# Patient Record
Sex: MALE | Race: WHITE | HISPANIC OR LATINO
[De-identification: names, ages, dates, MRNs, and addresses within clinical notes are randomized per-mention and may not be internally consistent; named-entity substitution may affect disease eponyms.]

---

## 2021-05-11 ENCOUNTER — NON-APPOINTMENT (OUTPATIENT)
Age: 46
End: 2021-05-11

## 2021-05-11 ENCOUNTER — APPOINTMENT (OUTPATIENT)
Dept: CARDIOLOGY | Facility: CLINIC | Age: 46
End: 2021-05-11
Payer: COMMERCIAL

## 2021-05-11 VITALS
WEIGHT: 195 LBS | BODY MASS INDEX: 27.92 KG/M2 | HEART RATE: 117 BPM | DIASTOLIC BLOOD PRESSURE: 82 MMHG | HEIGHT: 70 IN | OXYGEN SATURATION: 97 % | SYSTOLIC BLOOD PRESSURE: 130 MMHG

## 2021-05-11 DIAGNOSIS — I48.0 PAROXYSMAL ATRIAL FIBRILLATION: ICD-10-CM

## 2021-05-11 DIAGNOSIS — Z00.00 ENCOUNTER FOR GENERAL ADULT MEDICAL EXAMINATION W/OUT ABNORMAL FINDINGS: ICD-10-CM

## 2021-05-11 DIAGNOSIS — Z87.891 PERSONAL HISTORY OF NICOTINE DEPENDENCE: ICD-10-CM

## 2021-05-11 DIAGNOSIS — Z83.438 FAMILY HISTORY OF OTHER DISORDER OF LIPOPROTEIN METABOLISM AND OTHER LIPIDEMIA: ICD-10-CM

## 2021-05-11 PROCEDURE — 93000 ELECTROCARDIOGRAM COMPLETE: CPT

## 2021-05-11 PROCEDURE — 99072 ADDL SUPL MATRL&STAF TM PHE: CPT

## 2021-05-11 PROCEDURE — 99245 OFF/OP CONSLTJ NEW/EST HI 55: CPT

## 2021-05-11 NOTE — DISCUSSION/SUMMARY
[ECG Normal Variant] : ECG normal variant [Non-specific ECG Changes] : abnormal ECG [Stress Test Treadmill] : an exercise treadmill test [Echocardiogram] : an echocardiogram [Hyperlipidemia] : hyperlipidemia [Stable] : stable [None] : There are no changes in medication management [Diet Modification] : diet modification [Exercise] : exercise [Weight Loss] : weight loss

## 2021-05-11 NOTE — HISTORY OF PRESENT ILLNESS
[FreeTextEntry1] : 45 year old male with prior hx of atrial fibrillation (2 episodes )after having alcohol , last  episode 2 years ago , hyperlipidemia , obesity , mild sleep apnea came for cardiac evaluation . Patient say he feels tingling sensation cold sensation when he runs in cold weather , noticed recently , patient denies any weakness or claudication , \par \par Patient did have hx of atrial fibrillation episode at age 35 when he had caffeine , alcohol,   again two years  after having alcohol 9 3 drinks ) converted to sinus rhythm \par \par patient does have elevated cholesterol , as well as HDL , patient does have hx mild sleep apnea , does not have day time fatigue , did gain 15 pounds , \par \par Patient exercise , does work out regularly , he has low heart rate  denies any dizziness or exertional fatigue

## 2021-05-11 NOTE — ASSESSMENT
[FreeTextEntry1] : Patient with above hx\par \par tingling sensation/ numbness extremities  while running in cold , possible cold induced vasospasm( Raynaud's mild )vs hyperventilation related ,  normal palpable arterial pulses  would obtain routine blood work \par \par hx of atrial fibrillation episodes last one 2 years , no recurrence on avoidance of alcohol intake , will obtain echo \par \par sinus bradycardia ICRBBB possible athlete heart  will obtain echo , exercise stress test to assess heart response to exercise .\par \par hyperlipidemia :  will obtain his blood work   encourage to loose weight with diet restriction \par \par Obesity : mild sleep apnea : encourage patient to follow life style modification ,weight loss , \par \par follow up after above ordered tests , and blood work \par

## 2021-05-11 NOTE — REASON FOR VISIT
[Symptom and Test Evaluation] : symptom and test evaluation [Arrhythmia/ECG Abnorrmalities] : arrhythmia/ECG abnormalities [Hyperlipidemia] : hyperlipidemia [Other: ____] : [unfilled]

## 2021-06-07 ENCOUNTER — APPOINTMENT (OUTPATIENT)
Dept: CARDIOLOGY | Facility: CLINIC | Age: 46
End: 2021-06-07
Payer: COMMERCIAL

## 2021-06-07 PROCEDURE — 93015 CV STRESS TEST SUPVJ I&R: CPT

## 2021-06-07 PROCEDURE — 99072 ADDL SUPL MATRL&STAF TM PHE: CPT

## 2021-06-07 PROCEDURE — 93306 TTE W/DOPPLER COMPLETE: CPT

## 2021-06-14 ENCOUNTER — APPOINTMENT (OUTPATIENT)
Dept: CARDIOLOGY | Facility: CLINIC | Age: 46
End: 2021-06-14
Payer: COMMERCIAL

## 2021-06-14 ENCOUNTER — TRANSCRIPTION ENCOUNTER (OUTPATIENT)
Age: 46
End: 2021-06-14

## 2021-06-14 VITALS
WEIGHT: 195 LBS | BODY MASS INDEX: 27.92 KG/M2 | DIASTOLIC BLOOD PRESSURE: 82 MMHG | TEMPERATURE: 98.6 F | HEIGHT: 70 IN | HEART RATE: 52 BPM | OXYGEN SATURATION: 97 % | SYSTOLIC BLOOD PRESSURE: 122 MMHG

## 2021-06-14 DIAGNOSIS — R20.2 PARESTHESIA OF SKIN: ICD-10-CM

## 2021-06-14 PROCEDURE — 99214 OFFICE O/P EST MOD 30 MIN: CPT

## 2021-06-14 PROCEDURE — 99072 ADDL SUPL MATRL&STAF TM PHE: CPT

## 2021-06-14 NOTE — HISTORY OF PRESENT ILLNESS
[FreeTextEntry1] : 45 year old male with prior hx of atrial fibrillation (2 episodes )after having alcohol , last  episode 2 years ago , hyperlipidemia , obesity , mild sleep apnea came for  follow up after having recommended test , patient had normal stress test without  stress induced arrhythmia ,  normal echo \par \par Patient did have hx of atrial fibrillation episode at age 35 when he had caffeine , alcohol,   again two years  after having alcohol 9 3 drinks ) converted to sinus rhythm \par \par patient does have elevated cholesterol , as well as HDL , patient does have hx mild sleep apnea , does not have day time fatigue , did gain 15 pounds ,   his blood work showed    \par \par Patient exercise , does work out regularly , he has low heart rate  denies any dizziness or exertional fatigue

## 2021-06-14 NOTE — CARDIOLOGY SUMMARY
[de-identified] : 5/11/21 marked sinus bradycardia  ICRBBB [de-identified] : 6/7/21 13 METS 87% MPHR  No ST changes [de-identified] : 6/7/21 normal study  EF 60%

## 2021-06-14 NOTE — ASSESSMENT
[FreeTextEntry1] : Patient with above hx\par \par tingling sensation/ numbness extremities  while running in cold , possible cold induced vasospasm( Raynaud's mild )vs hyperventilation related ,  normal palpable arterial pulses    \par \par hx of atrial fibrillation episodes last one 2 years , no recurrence on avoidance of alcohol intake , will obtain echo \par \par sinus bradycardia ICRBBB possible athlete heart   normal echo , normal exercise capacity with normal heart rate response \par \par hyperlipidemia :  will obtain his blood work   encourage to loose weight with diet restriction  continue red yeast \par \par Obesity : mild sleep apnea : encourage patient to follow life style modification ,weight loss , \par \par follow up after 6 months \par

## 2022-05-18 NOTE — DISCUSSION/SUMMARY
[ECG Normal Variant] : ECG normal variant [Non-specific ECG Changes] : abnormal ECG [Stress Test Treadmill] : an exercise treadmill test [Echocardiogram] : an echocardiogram [Hyperlipidemia] : hyperlipidemia [Stable] : stable [Diet Modification] : diet modification [Exercise] : exercise [Weight Loss] : weight loss

## 2022-05-23 ENCOUNTER — NON-APPOINTMENT (OUTPATIENT)
Age: 47
End: 2022-05-23

## 2022-05-23 ENCOUNTER — APPOINTMENT (OUTPATIENT)
Dept: CARDIOLOGY | Facility: CLINIC | Age: 47
End: 2022-05-23
Payer: COMMERCIAL

## 2022-05-23 VITALS
SYSTOLIC BLOOD PRESSURE: 124 MMHG | BODY MASS INDEX: 28.35 KG/M2 | HEIGHT: 70 IN | OXYGEN SATURATION: 99 % | HEART RATE: 47 BPM | DIASTOLIC BLOOD PRESSURE: 80 MMHG | WEIGHT: 198 LBS

## 2022-05-23 DIAGNOSIS — G47.30 SLEEP APNEA, UNSPECIFIED: ICD-10-CM

## 2022-05-23 PROCEDURE — 99214 OFFICE O/P EST MOD 30 MIN: CPT

## 2022-05-23 PROCEDURE — 93000 ELECTROCARDIOGRAM COMPLETE: CPT

## 2022-05-23 NOTE — CARDIOLOGY SUMMARY
[de-identified] : 5/23/22  marked sinus bradycardia  ICRBBB [de-identified] : 6/7/21 13 METS 87% MPHR  No ST changes [de-identified] : 6/7/21 normal study  EF 60%

## 2022-05-23 NOTE — ASSESSMENT
[FreeTextEntry1] : Patient with above hx\par \par tingling sensation/ numbness extremities  while running in cold , possible cold induced vasospasm( Raynaud's mild )vs hyperventilation related resolved without recurrence   normal palpable arterial pulses    \par \par hx of atrial fibrillation episodes last one 2 years , no recurrence on avoidance of alcohol intake , will obtain echo \par \par sinus bradycardia ICRBBB possible athlete heart   normal echo , normal exercise capacity with normal heart rate response \par \par hyperlipidemia :  will obtain his blood work   encourage to loose weight with diet restriction  continue red yeast \par \par Obesity : mild sleep apnea : encourage patient to follow life style modification ,weight loss , \par \par follow up after 6 months \par

## 2022-06-27 ENCOUNTER — APPOINTMENT (OUTPATIENT)
Dept: CARDIOLOGY | Facility: CLINIC | Age: 47
End: 2022-06-27

## 2022-06-28 ENCOUNTER — NON-APPOINTMENT (OUTPATIENT)
Age: 47
End: 2022-06-28

## 2022-11-21 ENCOUNTER — NON-APPOINTMENT (OUTPATIENT)
Age: 47
End: 2022-11-21

## 2022-11-21 ENCOUNTER — APPOINTMENT (OUTPATIENT)
Dept: CARDIOLOGY | Facility: CLINIC | Age: 47
End: 2022-11-21

## 2022-11-21 VITALS
SYSTOLIC BLOOD PRESSURE: 130 MMHG | WEIGHT: 195 LBS | HEIGHT: 70 IN | HEART RATE: 78 BPM | OXYGEN SATURATION: 98 % | BODY MASS INDEX: 27.92 KG/M2 | DIASTOLIC BLOOD PRESSURE: 82 MMHG

## 2022-11-21 VITALS — SYSTOLIC BLOOD PRESSURE: 130 MMHG | DIASTOLIC BLOOD PRESSURE: 80 MMHG

## 2022-11-21 DIAGNOSIS — E78.5 HYPERLIPIDEMIA, UNSPECIFIED: ICD-10-CM

## 2022-11-21 DIAGNOSIS — R94.31 ABNORMAL ELECTROCARDIOGRAM [ECG] [EKG]: ICD-10-CM

## 2022-11-21 DIAGNOSIS — E66.9 OBESITY, UNSPECIFIED: ICD-10-CM

## 2022-11-21 PROCEDURE — 93000 ELECTROCARDIOGRAM COMPLETE: CPT

## 2022-11-21 PROCEDURE — 99214 OFFICE O/P EST MOD 30 MIN: CPT | Mod: 25

## 2022-11-21 NOTE — ASSESSMENT
[FreeTextEntry1] : Patient with above hx\par \par tingling sensation/ numbness extremities  while running in cold , possible cold induced vasospasm( Raynaud's mild )vs hyperventilation related resolved without recurrence   normal palpable arterial pulses    no recurrence \par \par hx of atrial fibrillation episodes last one 2 years , no recurrence on avoidance of alcohol intake ,   no exercise induced arrhythmias \par \par sinus bradycardia ICRBBB possible athlete heart   normal echo , normal exercise capacity with normal heart rate response \par \par hyperlipidemia :  increasing LDL TC , had repeat blood work , will obtain his blood work report    encourage to loose weight with diet restriction  continue red yeast \par \par Obesity : mild sleep apnea : encourage patient to follow life style modification ,weight loss , \par \par follow up after 6 months \par

## 2022-11-21 NOTE — CARDIOLOGY SUMMARY
[de-identified] : 11/21/22   marked sinus bradycardia  ICRBBB [de-identified] : 6/7/21 13 METS 87% MPHR  No ST changes [de-identified] : 6/7/21 normal study  EF 60%

## 2022-11-21 NOTE — HISTORY OF PRESENT ILLNESS
[FreeTextEntry1] : 46 year old male with prior hx of atrial fibrillation (2 episodes )after having alcohol , last  episode 2 years ago , hyperlipidemia , obesity , mild sleep apnea came for  follow up says  he is doing well , \par \par \par patient was diagnosed to have mild sleep apnea , not happy with cpap , which is interfering  with his sleep pattern so he stopped using it , as he did not feel any difference \par \par Patient denies active complain \par \par patient had normal stress test without  stress induced arrhythmia ,  normal echo \par \par Patient did have hx of atrial fibrillation episode at age 35 when he had caffeine , alcohol,   again two years  after having alcohol  drinks ) converted to sinus rhythm \par \par patient does have elevated cholesterol , as well as HDL , patient does have hx mild sleep apnea , does not have day time fatigue, stable weight ,    his blood work showed       he is taking red yeast rice . his blood pressure is controlled \par \par Patient exercise , does work out regularly , he has low heart rate  denies any dizziness or exertional fatigue

## 2025-02-11 ENCOUNTER — NON-APPOINTMENT (OUTPATIENT)
Age: 50
End: 2025-02-11

## 2025-02-11 ENCOUNTER — APPOINTMENT (OUTPATIENT)
Dept: CARDIOLOGY | Facility: CLINIC | Age: 50
End: 2025-02-11
Payer: COMMERCIAL

## 2025-02-11 VITALS
OXYGEN SATURATION: 100 % | BODY MASS INDEX: 28.77 KG/M2 | SYSTOLIC BLOOD PRESSURE: 120 MMHG | WEIGHT: 201 LBS | HEIGHT: 70 IN | DIASTOLIC BLOOD PRESSURE: 80 MMHG | HEART RATE: 57 BPM

## 2025-02-11 DIAGNOSIS — G47.30 SLEEP APNEA, UNSPECIFIED: ICD-10-CM

## 2025-02-11 DIAGNOSIS — R94.31 ABNORMAL ELECTROCARDIOGRAM [ECG] [EKG]: ICD-10-CM

## 2025-02-11 DIAGNOSIS — E78.5 HYPERLIPIDEMIA, UNSPECIFIED: ICD-10-CM

## 2025-02-11 DIAGNOSIS — E66.3 OVERWEIGHT: ICD-10-CM

## 2025-02-11 PROCEDURE — G2211 COMPLEX E/M VISIT ADD ON: CPT | Mod: NC

## 2025-02-11 PROCEDURE — 93000 ELECTROCARDIOGRAM COMPLETE: CPT

## 2025-02-11 PROCEDURE — 99214 OFFICE O/P EST MOD 30 MIN: CPT

## 2025-03-05 ENCOUNTER — APPOINTMENT (OUTPATIENT)
Dept: CARDIOLOGY | Facility: CLINIC | Age: 50
End: 2025-03-05
Payer: COMMERCIAL

## 2025-03-05 PROCEDURE — 93015 CV STRESS TEST SUPVJ I&R: CPT

## 2025-03-05 PROCEDURE — 93306 TTE W/DOPPLER COMPLETE: CPT

## 2025-04-01 ENCOUNTER — APPOINTMENT (OUTPATIENT)
Dept: CARDIOLOGY | Facility: CLINIC | Age: 50
End: 2025-04-01
Payer: COMMERCIAL

## 2025-04-01 VITALS
OXYGEN SATURATION: 98 % | HEART RATE: 51 BPM | BODY MASS INDEX: 28.2 KG/M2 | HEIGHT: 70 IN | SYSTOLIC BLOOD PRESSURE: 126 MMHG | WEIGHT: 197 LBS | DIASTOLIC BLOOD PRESSURE: 82 MMHG

## 2025-04-01 DIAGNOSIS — G47.30 SLEEP APNEA, UNSPECIFIED: ICD-10-CM

## 2025-04-01 DIAGNOSIS — R94.31 ABNORMAL ELECTROCARDIOGRAM [ECG] [EKG]: ICD-10-CM

## 2025-04-01 DIAGNOSIS — E66.9 OBESITY, UNSPECIFIED: ICD-10-CM

## 2025-04-01 DIAGNOSIS — E78.5 HYPERLIPIDEMIA, UNSPECIFIED: ICD-10-CM

## 2025-04-01 PROCEDURE — 99214 OFFICE O/P EST MOD 30 MIN: CPT

## 2025-04-01 PROCEDURE — G2211 COMPLEX E/M VISIT ADD ON: CPT | Mod: NC

## 2025-04-01 RX ORDER — ROSUVASTATIN CALCIUM 10 MG/1
10 TABLET, FILM COATED ORAL
Qty: 90 | Refills: 1 | Status: ACTIVE | COMMUNITY
Start: 2025-04-01 | End: 1900-01-01

## 2025-05-13 ENCOUNTER — APPOINTMENT (OUTPATIENT)
Dept: CARDIOLOGY | Facility: CLINIC | Age: 50
End: 2025-05-13

## 2025-05-16 ENCOUNTER — APPOINTMENT (OUTPATIENT)
Dept: CT IMAGING | Facility: CLINIC | Age: 50
End: 2025-05-16
Payer: SELF-PAY

## 2025-05-16 PROCEDURE — 75571 CT HRT W/O DYE W/CA TEST: CPT
